# Patient Record
Sex: FEMALE | Race: WHITE | NOT HISPANIC OR LATINO | URBAN - METROPOLITAN AREA
[De-identification: names, ages, dates, MRNs, and addresses within clinical notes are randomized per-mention and may not be internally consistent; named-entity substitution may affect disease eponyms.]

---

## 2019-05-08 ENCOUNTER — EMERGENCY (EMERGENCY)
Facility: HOSPITAL | Age: 84
LOS: 1 days | Discharge: HOME | End: 2019-05-08
Attending: EMERGENCY MEDICINE | Admitting: EMERGENCY MEDICINE
Payer: MEDICARE

## 2019-05-08 VITALS
RESPIRATION RATE: 18 BRPM | TEMPERATURE: 99 F | DIASTOLIC BLOOD PRESSURE: 67 MMHG | OXYGEN SATURATION: 98 % | SYSTOLIC BLOOD PRESSURE: 130 MMHG | HEART RATE: 74 BPM

## 2019-05-08 DIAGNOSIS — M54.9 DORSALGIA, UNSPECIFIED: ICD-10-CM

## 2019-05-08 DIAGNOSIS — I63.9 CEREBRAL INFARCTION, UNSPECIFIED: ICD-10-CM

## 2019-05-08 DIAGNOSIS — Z79.1 LONG TERM (CURRENT) USE OF NON-STEROIDAL ANTI-INFLAMMATORIES (NSAID): ICD-10-CM

## 2019-05-08 DIAGNOSIS — Z79.82 LONG TERM (CURRENT) USE OF ASPIRIN: ICD-10-CM

## 2019-05-08 DIAGNOSIS — E78.5 HYPERLIPIDEMIA, UNSPECIFIED: ICD-10-CM

## 2019-05-08 DIAGNOSIS — Z79.899 OTHER LONG TERM (CURRENT) DRUG THERAPY: ICD-10-CM

## 2019-05-08 LAB
ALBUMIN SERPL ELPH-MCNC: 3.3 G/DL — LOW (ref 3.4–5)
ALP SERPL-CCNC: 108 U/L — SIGNIFICANT CHANGE UP (ref 40–120)
ALT FLD-CCNC: 17 U/L — SIGNIFICANT CHANGE UP (ref 12–42)
ANION GAP SERPL CALC-SCNC: 6 MMOL/L — LOW (ref 9–16)
APPEARANCE UR: CLEAR — SIGNIFICANT CHANGE UP
AST SERPL-CCNC: 22 U/L — SIGNIFICANT CHANGE UP (ref 15–37)
BACTERIA # UR AUTO: PRESENT /HPF
BASOPHILS NFR BLD AUTO: 0.5 % — SIGNIFICANT CHANGE UP (ref 0–2)
BILIRUB SERPL-MCNC: 0.7 MG/DL — SIGNIFICANT CHANGE UP (ref 0.2–1.2)
BILIRUB UR-MCNC: ABNORMAL
BUN SERPL-MCNC: 23 MG/DL — SIGNIFICANT CHANGE UP (ref 7–23)
CALCIUM SERPL-MCNC: 9.3 MG/DL — SIGNIFICANT CHANGE UP (ref 8.5–10.5)
CHLORIDE SERPL-SCNC: 106 MMOL/L — SIGNIFICANT CHANGE UP (ref 96–108)
CO2 SERPL-SCNC: 28 MMOL/L — SIGNIFICANT CHANGE UP (ref 22–31)
COLOR SPEC: YELLOW — SIGNIFICANT CHANGE UP
CREAT SERPL-MCNC: 1.17 MG/DL — SIGNIFICANT CHANGE UP (ref 0.5–1.3)
DIFF PNL FLD: NEGATIVE — SIGNIFICANT CHANGE UP
EOSINOPHIL NFR BLD AUTO: 1.2 % — SIGNIFICANT CHANGE UP (ref 0–6)
EPI CELLS # UR: ABNORMAL /HPF (ref 0–5)
GLUCOSE SERPL-MCNC: 106 MG/DL — HIGH (ref 70–99)
GLUCOSE UR QL: NEGATIVE — SIGNIFICANT CHANGE UP
HCT VFR BLD CALC: 41 % — SIGNIFICANT CHANGE UP (ref 34.5–45)
HGB BLD-MCNC: 14 G/DL — SIGNIFICANT CHANGE UP (ref 11.5–15.5)
HYALINE CASTS # UR AUTO: ABNORMAL /LPF (ref 0–2)
IMM GRANULOCYTES NFR BLD AUTO: 0.8 % — SIGNIFICANT CHANGE UP (ref 0–1.5)
KETONES UR-MCNC: NEGATIVE — SIGNIFICANT CHANGE UP
LEUKOCYTE ESTERASE UR-ACNC: NEGATIVE — SIGNIFICANT CHANGE UP
LYMPHOCYTES # BLD AUTO: 18.9 % — SIGNIFICANT CHANGE UP (ref 13–44)
MAGNESIUM SERPL-MCNC: 1.8 MG/DL — SIGNIFICANT CHANGE UP (ref 1.6–2.6)
MCHC RBC-ENTMCNC: 30.2 PG — SIGNIFICANT CHANGE UP (ref 27–34)
MCHC RBC-ENTMCNC: 34.1 G/DL — SIGNIFICANT CHANGE UP (ref 32–36)
MCV RBC AUTO: 88.6 FL — SIGNIFICANT CHANGE UP (ref 80–100)
MONOCYTES NFR BLD AUTO: 10.6 % — SIGNIFICANT CHANGE UP (ref 2–14)
NEUTROPHILS NFR BLD AUTO: 68 % — SIGNIFICANT CHANGE UP (ref 43–77)
NITRITE UR-MCNC: NEGATIVE — SIGNIFICANT CHANGE UP
PH UR: 5.5 — SIGNIFICANT CHANGE UP (ref 5–8)
PLATELET # BLD AUTO: 183 K/UL — SIGNIFICANT CHANGE UP (ref 150–400)
POTASSIUM SERPL-MCNC: 3.7 MMOL/L — SIGNIFICANT CHANGE UP (ref 3.5–5.3)
POTASSIUM SERPL-SCNC: 3.7 MMOL/L — SIGNIFICANT CHANGE UP (ref 3.5–5.3)
PROT SERPL-MCNC: 6.9 G/DL — SIGNIFICANT CHANGE UP (ref 6.4–8.2)
PROT UR-MCNC: ABNORMAL MG/DL
RBC # BLD: 4.63 M/UL — SIGNIFICANT CHANGE UP (ref 3.8–5.2)
RBC # FLD: 12.2 % — SIGNIFICANT CHANGE UP (ref 10.3–14.5)
RBC CASTS # UR COMP ASSIST: < 5 /HPF — SIGNIFICANT CHANGE UP
SODIUM SERPL-SCNC: 140 MMOL/L — SIGNIFICANT CHANGE UP (ref 132–145)
SP GR SPEC: >=1.03 — SIGNIFICANT CHANGE UP (ref 1–1.03)
UROBILINOGEN FLD QL: 1 E.U./DL — SIGNIFICANT CHANGE UP
WBC # BLD: 8.5 K/UL — SIGNIFICANT CHANGE UP (ref 3.8–10.5)
WBC # FLD AUTO: 8.5 K/UL — SIGNIFICANT CHANGE UP (ref 3.8–10.5)
WBC UR QL: < 5 /HPF — SIGNIFICANT CHANGE UP

## 2019-05-08 PROCEDURE — 70450 CT HEAD/BRAIN W/O DYE: CPT | Mod: 26

## 2019-05-08 PROCEDURE — 71045 X-RAY EXAM CHEST 1 VIEW: CPT | Mod: 26

## 2019-05-08 PROCEDURE — 72170 X-RAY EXAM OF PELVIS: CPT | Mod: 26

## 2019-05-08 PROCEDURE — 72100 X-RAY EXAM L-S SPINE 2/3 VWS: CPT | Mod: 26

## 2019-05-08 PROCEDURE — 99284 EMERGENCY DEPT VISIT MOD MDM: CPT | Mod: 25

## 2019-05-08 RX ORDER — SODIUM CHLORIDE 9 MG/ML
3 INJECTION INTRAMUSCULAR; INTRAVENOUS; SUBCUTANEOUS EVERY 8 HOURS
Qty: 0 | Refills: 0 | Status: DISCONTINUED | OUTPATIENT
Start: 2019-05-08 | End: 2019-05-12

## 2019-05-08 RX ADMIN — SODIUM CHLORIDE 3 MILLILITER(S): 9 INJECTION INTRAMUSCULAR; INTRAVENOUS; SUBCUTANEOUS at 15:07

## 2019-05-08 NOTE — ED PROVIDER NOTE - NSFOLLOWUPINSTRUCTIONS_ED_ALL_ED_FT
Please follow up with the neurologist    Please arranged for a visiting doctor for your mom.    Return for any worsening symptoms: unilateral focal weakness, seizure, severe headache.

## 2019-05-08 NOTE — ED PROVIDER NOTE - PROGRESS NOTE DETAILS
CT brain with unchanged size of hyperdensity in parafalcine region.  Possible acute lacunar infarct.  Discussed with patient and son in detail.  She is adamant about not staying in the hospital and had been part of the conversation prior to obtaining the imaging.  Currently on ASA, statin.  Will give follow up to see Dr. Anderson.  Reviewed case with Dr. Anderson, since symptoms have been gradual in onset and no focal weakness would not recommend adding pavix or other agents.

## 2019-05-08 NOTE — ED PROVIDER NOTE - CLINICAL SUMMARY MEDICAL DECISION MAKING FREE TEXT BOX
89 y/o F with PMHx of hyperlipidemia and arthritis presents to ED for loss of appetite. Pt was here multiple times in 2015 and 2016 for shoulder and back pain. We have reviewed her XR from 2016. There is evidence of L4-L5 anterolisthesis, as well as compression deformity of L1. 89 y/o F with PMHx of hyperlipidemia and arthritis presents to ED for loss of appetite, ? sundowning behavior.  BIB son and HHA.  Ambulates with rolling walker.  Has 24 hour care at home.  Does not leave her home or see a doctor regularly.  Medication list reviewed.   Patient is lucid, AAOx 3. Non focal neurological exam.  No evidence of recent trauma or injury.  Lungs clear, no abdominal tenderness.  Imaging, labs reviewed.  Prior to discharged reviewed previous CT scan which was concerning for meningioma.  As per son patient did not follow up to have MRI performed.  She denies headaches, ataxia.  Amenable to repeat imaging today.  Patient seen by SW and APS report entered.  Also given resources for home care and added A coverage.

## 2019-05-08 NOTE — ED PROVIDER NOTE - OBJECTIVE STATEMENT
89 y/o F with PMHx of hyperlipidemia and arthritis is accompanied to ED with a family member for loss of appetite. Pt's son provides Hx, and states she has not been eating or drinking water for the past 2 weeks, and arrived to ED out of concern for possible dehydration. Her son also states Pt has not been sleeping well for the past 2 weeks as well. Pt reports of having back pain for about a week, and pain on her R side for the past few days. Her son reports she fell on her back while in her apartment. Pt denies any BRAND, dizziness, or light headedness. She attests to not having a PCP.     Pt takes ABX, Citalopram, Donepezil, Simvastatin, Vit. D3, Famotidine, stool softeners, ASA, and Vit B12. 89 y/o F with PMHx of hyperlipidemia and arthritis is accompanied to ED with a family member for loss of appetite. Pt's son provides Hx, and states she has not been eating or drinking water for the past 2 weeks, and arrived to ED out of concern for possible dehydration. Her son also states Pt has not been sleeping well for the past 2 weeks as well. Pt reports of having back pain for about a week, and pain on her R side for the past few days. Her son reports she fell on her back while in her apartment. Pt denies any BRAND, dizziness, or light headedness. She attests to not having a PCP.  Accompanied by her HHA and has 7 days/week nearly 24 hours of coverage.      Pt takes ABX, Citalopram, Donepezil, Simvastatin, Vit. D3, Famotidine, stool softeners, ASA, and Vit B12.

## 2019-05-08 NOTE — ED PROVIDER NOTE - PRIOR HOSPITAL/ED VISITS SUMMARY FREE TEXT FOR MDM OBTAINED AND REVIEWED OLD RECORDS QUESTION
Pt was here multiple times in 2015 and 2016 for shoulder and back pain. We have reviewed her XR from 2016. There is evidence of L4-L5 anterolisthesis, as well as compression deformity of L1.

## 2019-05-08 NOTE — ED PROVIDER NOTE - DIAGNOSTIC INTERPRETATION
ER Physician: Ray Figueroa  CXR INTERPRETATION:  no acute fracture; no soft tissue swelling noted; normal bony alignment.   ER Physician: Ray Figueroa  Pelvis INTERPRETATION:  no acute fracture; no soft tissue swelling noted; normal bony alignment.  ER Physician: Ray Figueroa  Spine INTERPRETATION:  no acute fracture; no soft tissue swelling noted; normal bony alignment.

## 2019-05-08 NOTE — ED ADULT TRIAGE NOTE - CHIEF COMPLAINT QUOTE
as per son failure to thrive- loss of appetite for 2 days. denies pain. recent UTI on levaquin. also complains lower back pain s/p fall 1 week ago/as per aide

## 2019-05-08 NOTE — ED PROVIDER NOTE - CARE PROVIDER_API CALL
Rahat Anderson (DO)  Critical Care Medicine; Neurology  [address], [address] [facility] [place] [address]  Phone: [phone]  Fax: [phone]  Follow Up Time:

## 2019-05-08 NOTE — ED ADULT NURSE NOTE - NSFALLRSKHRMRISKTYPE_ED_ALL_ED
age(85 years old or older)/coagulation(Bleeding disorder R/T clinical cond/anti-coags)/bones(Osteoporosis,prev fx,steroid use,metastatic bone ca)

## 2019-05-08 NOTE — ED PROVIDER NOTE - MUSCULOSKELETAL, MLM
Spine appears normal, range of motion is not limited, no muscle or joint tenderness.  No leg shortening.  Negative modified straight leg raise.  No pain with log rolling or internal/external rotation of the hips.  NO midline spine tenderness.

## 2019-05-08 NOTE — ED PROVIDER NOTE - PMH
Arthritis  in LE b/l, ambulatory with cane at baseline  Constipation  Constipation  Dementia without behavioral disturbance  Dementia  Gastroesophageal reflux disease  GERD (gastroesophageal reflux disease)  Hyperlipidemia  Hyperlipidemia  Hypertonicity of bladder  Overactive bladder

## 2019-05-09 NOTE — ED POST DISCHARGE NOTE - DETAILS
left message for son on 5/9/19 Spoke with son- Sw will try to set them up with care. She has no PMD and is home bound.

## 2019-05-09 NOTE — ED BEHAVIORAL HEALTH NOTE - BEHAVIORAL HEALTH NOTE
Post Discharge note: This writer meet with the patient and her son Tonio yesterday prior to discharge. The patient is a 89 year old female who lives at home with her 24/7 that she and her family pay privately for. Per the patients son the patient ambulates with a walker and has not left her home in over a year and half and currently has no pcp. This writer spoke with the son and he is currently in the process of reactivating her medicaid. Her was provided with the suggestion of following up with an elder care attonery but he declined and was provided with information for the [facility] medicaid office, the HRA offices and [facility] social . The patient and her son were also offered for this writer to set her up with a new pcp in the community but they also declined due to her mobility issue. They were also provided with information for the house calls programs, in which doctors could see her at home, which they were interested in.     This writer also put in an APS  (adult Protective Service) report through their online system due to her safety concern, the fact that she has not seen a doctor in over 1.5 years and the son being slightly overwhelmed. The reference number is [id number].    Transportation was also arranged with a bill back to the facility as the patients insurance denied coverage for the transportation. Senior staff was made aware of the bill back.    This afternoon patient was alerted to the fact that the patient has a public fx and would need home care services. However, since the patient does not have a pcp in the community, home care services could not be in place as there is no pcp to sign off on the orders and take care of the patient in the community. This was explained to the patients son Tonio who expressed understanding and stated that he would call the house call programs in order to get her seen. He was also offered for the writer to arrange a pcp apt in the community but he declined again. Worker made available for any further assistance needed

## 2019-05-22 PROBLEM — Z00.00 ENCOUNTER FOR PREVENTIVE HEALTH EXAMINATION: Status: ACTIVE | Noted: 2019-05-22

## 2019-05-23 ENCOUNTER — APPOINTMENT (OUTPATIENT)
Dept: INTERNAL MEDICINE | Facility: HOME HEALTH | Age: 84
End: 2019-05-23
Payer: MEDICARE

## 2019-05-23 VITALS
HEART RATE: 69 BPM | HEIGHT: 57 IN | TEMPERATURE: 98.4 F | RESPIRATION RATE: 18 BRPM | DIASTOLIC BLOOD PRESSURE: 60 MMHG | WEIGHT: 120 LBS | SYSTOLIC BLOOD PRESSURE: 118 MMHG | OXYGEN SATURATION: 95 % | BODY MASS INDEX: 25.89 KG/M2

## 2019-05-23 DIAGNOSIS — Z87.891 PERSONAL HISTORY OF NICOTINE DEPENDENCE: ICD-10-CM

## 2019-05-23 DIAGNOSIS — Z82.3 FAMILY HISTORY OF STROKE: ICD-10-CM

## 2019-05-23 DIAGNOSIS — Z86.73 PERSONAL HISTORY OF TRANSIENT ISCHEMIC ATTACK (TIA), AND CEREBRAL INFARCTION W/OUT RESIDUAL DEFICITS: ICD-10-CM

## 2019-05-23 DIAGNOSIS — Z78.9 OTHER SPECIFIED HEALTH STATUS: ICD-10-CM

## 2019-05-23 DIAGNOSIS — Z98.890 OTHER SPECIFIED POSTPROCEDURAL STATES: ICD-10-CM

## 2019-05-23 DIAGNOSIS — M54.5 LOW BACK PAIN: ICD-10-CM

## 2019-05-23 PROCEDURE — 99350 HOME/RES VST EST HIGH MDM 60: CPT

## 2019-05-23 PROCEDURE — G0506: CPT

## 2019-05-23 PROCEDURE — 99497 ADVNCD CARE PLAN 30 MIN: CPT

## 2019-05-23 RX ORDER — DOCUSATE SODIUM 100 MG/1
100 CAPSULE, LIQUID FILLED ORAL AT BEDTIME
Qty: 90 | Refills: 3 | Status: ACTIVE | COMMUNITY
Start: 2019-05-23

## 2019-05-23 RX ORDER — CHLORHEXIDINE GLUCONATE 4 %
1000 LIQUID (ML) TOPICAL DAILY
Qty: 90 | Refills: 3 | Status: ACTIVE | COMMUNITY
Start: 2019-05-23

## 2019-05-23 NOTE — PHYSICAL EXAM
[No Acute Distress] : no acute distress [Well Nourished] : well nourished [Well Developed] : well developed [Normal Sclera/Conjunctiva] : normal sclera/conjunctiva [EOMI] : extra ocular movement intact [Normal Outer Ear/Nose] : the ears and nose were normal in appearance [Normal Oropharynx] : the oropharynx was normal [No Respiratory Distress] : no respiratory distress [Clear to Auscultation] : lungs were clear to auscultation bilaterally [No Accessory Muscle Use] : no accessory muscle use [Normal Rate] : heart rate was normal  [Regular Rhythm] : with a regular rhythm [Normal S1, S2] : normal S1 and S2 [No Murmurs] : no murmurs heard [No Edema] : there was no peripheral edema [Normal Bowel Sounds] : normal bowel sounds [Soft] : abdomen soft [Not Distended] : not distended [Normal Post Cervical Nodes] : no posterior cervical lymphadenopathy [Normal Anterior Cervical Nodes] : no anterior cervical lymphadenopathy [No CVA Tenderness] : no ~M costovertebral angle tenderness [No Spinal Tenderness] : no spinal tenderness [Normal Strength/Tone] : muscle strength and tone were normal [No Rash] : no rash [Oriented x3] : oriented to person, place, and time [Normal Affect] : the affect was normal [No Joint Swelling] : no joint swelling seen [Cranial Nerves Intact] : cranial nerves 2-12 were intact [de-identified] : Frail elderly women sitting in the chair, pleasant with no cardiovascular or respiratory distress noted [de-identified] : Complaining of abdominal but no pain noted on palpation.  [de-identified] : dry skin

## 2019-05-23 NOTE — HISTORY OF PRESENT ILLNESS
[Patient] : patient [Family Member] : family member [FreeTextEntry1] : Dementia with behavioral disturbance, impaired gait unable to leave home.  right superior pubic fracture.   [FreeTextEntry2] : ELIAZAR HE is an 89 year with arthritis of bilateral lower extremities, dementia with behavioral disturbance, depression gastroesophageal reflux disease,CAD with PCI, hyperlipidemia, hypertonicity of bladder overactive bladder seen today for initial home visit.\par \par Accompanying patient today is her son Tonio\par Patient's last hospitalization was on 5/8/19 for an ER visit after sustaining a fall.  Her son Tonio also had concerns that she was not eating or drinking well.  Hospital labs were unremarkable other then a low albumin level.  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 These results were compatible with previous films in 2012.  Pelvis x-rays  also showed a subacute fracture of the right superior and inferior rami of the right pubic bone.\par Since hospitalization patient/ patient's caregiver reports that the patient is constipated currently takes Colace sometimes.  Reports occasional abdominal pain which she feels is related to the constipation\par Patient/ patient's caregiver reports no weight loss >10lbs in the past 6 months. No changes in dentition or swallowing reported, No changes in hearing or vision reported. No changes in Cognition reported. Patient denies any symptoms of depression or anxiety. Patient is ADL and IADL dependent. No changes in gait or falls reported. \par Patient's home environment is safe. \par Goals of care discussed. MOLST completed. \par \par

## 2019-05-23 NOTE — COUNSELING
[Normal Weight (BMI <25)] : normal weight - BMI <25 [High cholesterol self management education material provided] : High cholesterol self management education material provided [Non - Smoker] : non-smoker [Use assistive device to avoid falls] : use assistive device to avoid falls [Improve mobility] : improve mobility [Decrease hospital use] : decrease hospital use [Maintain functional ability] : maintain functional ability [Discussed disease trajectory with patient/caregiver] : discussed disease trajectory with patient/caregiver [DNR] : Code Status: DNR [Limited] : Treatment Guidelines: Limited [DNI] : Intubation: DNI [[facility]: _____[facility]: [unfilled] [_____] : HCP: [unfilled] [ - New patient with 2 or more chronic conditions; CCM discussed and patient-centered care plan established] : New patient with 2 or more chronic conditions; CCM discussed and patient-centered care plan established [Patient/Caregiver not ready to engage in discussion] : patient/caregiver not ready to engage in discussion [Advanced Directives discussed: ____] : Advanced directives discussed: [unfilled] [FreeTextEntry4] : Educated patient/legal representative about CCM services and consent.\par Educated patient/legal representative that this service is available because they have 2 or more chronic conditions, that only one Provider can furnish CCM Services per calendar month and that CCM services are subject to the usual Medicare deductible and coinsurance. \par Patient/legal representative understands the right to stop the CCM services at any time by notifying House Calls office.\par Patient/legal representative has verbally consented (y/n):YES\par \par

## 2019-05-23 NOTE — CHRONIC CARE ASSESSMENT
[Recent decline in ADLs or iADLs] : Recent decline in ADLs or IADLs [Reviewed Fall Risk from Comprehensive Assessment] : Reviewed fall risk from comprehensive assessment [No Action Needed] : No action needed [PPS Score: ____] : Palliative Performance Scale (PPS) Score: [unfilled] [Current] : Current Pain: [Oriented To Person] : ~L oriented to person [Oriented To Place] : ~L oriented to place [Oriented To Time] : ~L oriented to time [Alert] : ~L alert [Reviewed Mini-Cog Outcomes from Comprehensive Assessment] : Reviewed Mini-Cog outcomes from comprehensive assessment [FAST Score: ____] : Functional Assessment Scale (FAST) Score: [unfilled] [Safety elements used in home] : No safety elements used in home [Reports changes in hearing] : reports no changes in hearing [Reports changes in vision] : Reports no changes in vision [de-identified] : Abdominal related to constipation.  [de-identified] : Currently on Celexa [de-identified] : Receives 24 home health care services

## 2019-05-23 NOTE — REASON FOR VISIT
[Initial Evaluation] : an initial evaluation [Pre-Visit Preparation] : pre-visit preparation was done [Family Member] : family member [Formal Caregiver] : formal caregiver [FreeTextEntry1] : Dementia with behavioral disturbance, impaired gait unable to leave home.  right superior pubic fracture.

## 2019-05-23 NOTE — REVIEW OF SYSTEMS
[Negative] : Heme/Lymph [Abdominal Pain] : abdominal pain [Constipation] : constipation [Joint Pain] : joint pain [Joint Stiffness] : joint stiffness [Joint Swelling] : joint swelling [Unsteady Walking] : ataxia [Depression] : depression [FreeTextEntry2] : Frail elderly female [de-identified] : On Celexa

## 2019-05-31 ENCOUNTER — MOBILE ON CALL (OUTPATIENT)
Age: 84
End: 2019-05-31

## 2019-05-31 RX ORDER — MELATONIN 3 MG
25 MCG TABLET ORAL DAILY
Qty: 90 | Refills: 3 | Status: ACTIVE | COMMUNITY
Start: 2019-05-31 | End: 1900-01-01

## 2019-06-25 ENCOUNTER — APPOINTMENT (OUTPATIENT)
Dept: INTERNAL MEDICINE | Facility: HOME HEALTH | Age: 84
End: 2019-06-25
Payer: MEDICARE

## 2019-06-25 VITALS
SYSTOLIC BLOOD PRESSURE: 120 MMHG | HEART RATE: 70 BPM | TEMPERATURE: 97 F | RESPIRATION RATE: 18 BRPM | BODY MASS INDEX: 25.89 KG/M2 | DIASTOLIC BLOOD PRESSURE: 80 MMHG | WEIGHT: 120 LBS | OXYGEN SATURATION: 98 % | HEIGHT: 57 IN

## 2019-06-25 DIAGNOSIS — Z91.89 OTHER SPECIFIED PERSONAL RISK FACTORS, NOT ELSEWHERE CLASSIFIED: ICD-10-CM

## 2019-06-25 PROCEDURE — 99349 HOME/RES VST EST MOD MDM 40: CPT

## 2019-06-25 NOTE — COUNSELING
[Normal Weight (BMI <25)] : normal weight - BMI <25 [Non - Smoker] : non-smoker [High cholesterol self management education material provided] : High cholesterol self management education material provided [Improve mobility] : improve mobility [Use assistive device to avoid falls] : use assistive device to avoid falls [Maintain functional ability] : maintain functional ability [Decrease hospital use] : decrease hospital use [Patient/Caregiver not ready to engage in discussion] : patient/caregiver not ready to engage in discussion [Discussed disease trajectory with patient/caregiver] : discussed disease trajectory with patient/caregiver [DNR] : Code Status: DNR [Advanced Directives discussed: ____] : Advanced directives discussed: [unfilled] [Limited] : Treatment Guidelines: Limited [DNI] : Intubation: DNI [[facility]: _____[facility]/last verification date: [unfilled] [_____] : HCP: [unfilled] [FreeTextEntry4] : Educated patient/legal representative about CCM services and consent.\par Educated patient/legal representative that this service is available because they have 2 or more chronic conditions, that only one Provider can furnish CCM Services per calendar month and that CCM services are subject to the usual Medicare deductible and coinsurance. \par Patient/legal representative understands the right to stop the CCM services at any time by notifying House Calls office.\par Patient/legal representative has verbally consented (y/n):YES\par \par

## 2019-06-25 NOTE — HISTORY OF PRESENT ILLNESS
[Patient] : patient [Family Member] : family member [FreeTextEntry2] : ELIAZAR HE is an 89 year with arthritis of bilateral lower extremities, dementia with behavioral disturbance, depression gastroesophageal reflux disease,CAD with PCI, hyperlipidemia, hypertonicity of bladder overactive bladder seen today for follow up visit\par \par On last visit patient's caregiver reported increased constipated currently takes Colace sometimes.  Reports occasional abdominal pain which she feels is related to the constipation\par \par Currently no concerns reported.  Appetite is good.  Sometimes non-compliant with medications.  Patient would like to ambulate more and build up her endurance.  \par \par Patient/ patient's caregiver reports no weight loss >10lbs in the past 6 months. No changes in dentition or swallowing reported, No changes in hearing or vision reported. No changes in Cognition reported. Patient denies any symptoms of depression or anxiety. Patient is ADL and IADL dependent. No changes in gait or falls reported. \par Patient's home environment is safe. \par \par \par  [FreeTextEntry1] : Dementia with behavioral disturbance, impaired gait unable to leave home.  right superior pubic fracture.

## 2019-06-25 NOTE — PHYSICAL EXAM
[No Acute Distress] : no acute distress [Well Nourished] : well nourished [Normal Sclera/Conjunctiva] : normal sclera/conjunctiva [Well Developed] : well developed [EOMI] : extra ocular movement intact [Normal Outer Ear/Nose] : the ears and nose were normal in appearance [No Respiratory Distress] : no respiratory distress [Normal Oropharynx] : the oropharynx was normal [Clear to Auscultation] : lungs were clear to auscultation bilaterally [No Accessory Muscle Use] : no accessory muscle use [Normal Rate] : heart rate was normal  [Regular Rhythm] : with a regular rhythm [Normal S1, S2] : normal S1 and S2 [No Murmurs] : no murmurs heard [No Edema] : there was no peripheral edema [Normal Bowel Sounds] : normal bowel sounds [Soft] : abdomen soft [Normal Post Cervical Nodes] : no posterior cervical lymphadenopathy [Normal Anterior Cervical Nodes] : no anterior cervical lymphadenopathy [Not Distended] : not distended [No CVA Tenderness] : no ~M costovertebral angle tenderness [No Spinal Tenderness] : no spinal tenderness [No Rash] : no rash [Oriented x3] : oriented to person, place, and time [Normal Affect] : the affect was normal [Cranial Nerves Intact] : cranial nerves 2-12 were intact [No JVD] : no jugular venous distention [Kyphosis] :  kyphosis present [de-identified] : Complaining of abdominal but no pain noted on palpation.  [de-identified] : Frail elderly women sitting in the chair, pleasant with no cardiovascular or respiratory distress noted during examination [de-identified] : dry skin

## 2019-06-25 NOTE — CHRONIC CARE ASSESSMENT
[FAST Score: ____] : Functional Assessment Scale (FAST) Score: [unfilled] [PPS Score: ____] : Palliative Performance Scale (PPS) Score: [unfilled] [Current] : Current Pain: [Oriented To Place] : ~L oriented to place [Oriented To Time] : ~L oriented to time [Oriented To Person] : ~L oriented to person [Alert] : ~L alert [Reviewed Mini-Cog Outcomes from Comprehensive Assessment] : Reviewed Mini-Cog outcomes from comprehensive assessment [Recent decline in ADLs or iADLs] : Recent decline in ADLs or IADLs [No Action Needed] : No action needed [Reviewed Fall Risk from Comprehensive Assessment] : Reviewed fall risk from comprehensive assessment [Reports changes in hearing] : reports no changes in hearing [Safety elements used in home] : No safety elements used in home [Reports changes in vision] : Reports no changes in vision [de-identified] : Abdominal related to constipation.  [de-identified] : Currently on Celexa [de-identified] : Receives 24 home health care services

## 2019-06-25 NOTE — REASON FOR VISIT
[Formal Caregiver] : formal caregiver [Pre-Visit Preparation] : pre-visit preparation was done [Follow-Up] : a follow-up visit [FreeTextEntry1] : Dementia with behavioral disturbance, impaired gait unable to leave home.  right superior pubic fracture.   [FreeTextEntry2] : medication, labs, orders.

## 2019-06-25 NOTE — REVIEW OF SYSTEMS
[Joint Pain] : joint pain [Joint Swelling] : joint swelling [Joint Stiffness] : joint stiffness [Unsteady Walking] : ataxia [Depression] : depression [Negative] : Neurological [Chest Pain] : no chest pain [Shortness Of Breath] : no shortness of breath [Cough] : no cough [Palpitations] : no palpitations [FreeTextEntry2] : Frail elderly female seen sitting in chair without cardiovascular or respiratory distress noted [Constipation] : no constipation [Abdominal Pain] : no abdominal pain [de-identified] : On Celexa

## 2019-07-15 ENCOUNTER — APPOINTMENT (OUTPATIENT)
Dept: INTERNAL MEDICINE | Facility: HOME HEALTH | Age: 84
End: 2019-07-15

## 2019-07-30 ENCOUNTER — APPOINTMENT (OUTPATIENT)
Dept: INTERNAL MEDICINE | Facility: HOME HEALTH | Age: 84
End: 2019-07-30
Payer: MEDICARE

## 2019-07-30 VITALS
TEMPERATURE: 97.4 F | DIASTOLIC BLOOD PRESSURE: 80 MMHG | HEART RATE: 68 BPM | OXYGEN SATURATION: 98 % | RESPIRATION RATE: 18 BRPM | SYSTOLIC BLOOD PRESSURE: 112 MMHG

## 2019-07-30 DIAGNOSIS — M17.0 BILATERAL PRIMARY OSTEOARTHRITIS OF KNEE: ICD-10-CM

## 2019-07-30 PROCEDURE — 99348 HOME/RES VST EST LOW MDM 30: CPT

## 2019-07-30 NOTE — COUNSELING
[Improve mobility] : improve mobility [Discussed disease trajectory with patient/caregiver] : discussed disease trajectory with patient/caregiver [Maintain functional ability] : maintain functional ability [Decrease hospital use] : decrease hospital use [Patient/Caregiver not ready to engage in discussion] : patient/caregiver not ready to engage in discussion [DNR] : Code Status: DNR [Advanced Directives discussed: ____] : Advanced directives discussed: [unfilled] [[facility] Date: _____] [facility] Completion/last verification date: [unfilled] [DNI] : Intubation: DNI [Limited] : Treatment Guidelines: Limited [_____] : HCP: [unfilled] [Normal Weight (BMI <25)] : normal weight - BMI <25 [Use assistive device to avoid falls] : use assistive device to avoid falls [Normal Weight - ( BMI  <25 )] : normal weight - ( BMI  <25 ) [High cholesterol self management education material provided] : high cholesterol self management education material provided [Hypertension self management education material provided] : hypertension self management education material provided [Non - Smoker] : non-smoker [Medical alert] : medical alert [] : foot exam [FreeTextEntry4] : Educated patient/legal representative about CCM services and consent.\par Educated patient/legal representative that this service is available because they have 2 or more chronic conditions, that only one Provider can furnish CCM Services per calendar month and that CCM services are subject to the usual Medicare deductible and coinsurance. \par Patient/legal representative understands the right to stop the CCM services at any time by notifying House Calls office.\par Patient/legal representative has verbally consented (y/n):YES\par \par

## 2019-07-30 NOTE — PHYSICAL EXAM
[No Acute Distress] : no acute distress [Well Nourished] : well nourished [Well Developed] : well developed [Normal Sclera/Conjunctiva] : normal sclera/conjunctiva [EOMI] : extra ocular movement intact [Normal Oropharynx] : the oropharynx was normal [Normal Outer Ear/Nose] : the ears and nose were normal in appearance [No Respiratory Distress] : no respiratory distress [No JVD] : no jugular venous distention [No Accessory Muscle Use] : no accessory muscle use [Clear to Auscultation] : lungs were clear to auscultation bilaterally [Normal Rate] : heart rate was normal  [Normal S1, S2] : normal S1 and S2 [Regular Rhythm] : with a regular rhythm [No Murmurs] : no murmurs heard [No Edema] : there was no peripheral edema [Normal Bowel Sounds] : normal bowel sounds [Not Distended] : not distended [Normal Post Cervical Nodes] : no posterior cervical lymphadenopathy [Soft] : abdomen soft [Normal Anterior Cervical Nodes] : no anterior cervical lymphadenopathy [No CVA Tenderness] : no ~M costovertebral angle tenderness [Kyphosis] :  kyphosis present [No Spinal Tenderness] : no spinal tenderness [Cranial Nerves Intact] : cranial nerves 2-12 were intact [Oriented x3] : oriented to person, place, and time [No Rash] : no rash [Normal Affect] : the affect was normal [Non Tender] : non-tender [de-identified] : dry skin [de-identified] : Frail elderly women sitting in the chair, pleasant with no cardiovascular or respiratory distress noted during examination

## 2019-07-30 NOTE — HEALTH RISK ASSESSMENT
[HRA Reviewed] : Health risk assessment reviewed [Full assistance needed] : managing finances [No falls in past year] : Patient reported no falls in the past year [Yes] : The patient does have visual impairment [TimeGetUpGo] : 0

## 2019-07-30 NOTE — REASON FOR VISIT
[Formal Caregiver] : formal caregiver [Follow-Up] : a follow-up visit [Pre-Visit Preparation] : pre-visit preparation was done [FreeTextEntry2] : medication, labs, orders.  [FreeTextEntry1] : Dementia with behavioral disturbance, impaired gait unable to leave home.  right superior pubic fracture.

## 2019-07-30 NOTE — REVIEW OF SYSTEMS
[Joint Stiffness] : joint stiffness [Joint Pain] : joint pain [Unsteady Walking] : ataxia [Joint Swelling] : joint swelling [Depression] : depression [Negative] : Heme/Lymph [Palpitations] : no palpitations [Shortness Of Breath] : no shortness of breath [Chest Pain] : no chest pain [Constipation] : no constipation [Abdominal Pain] : no abdominal pain [Cough] : no cough [de-identified] : On Celexa [FreeTextEntry2] : Frail elderly female seen sitting in chair without cardiovascular or respiratory distress noted

## 2019-07-30 NOTE — CHRONIC CARE ASSESSMENT
[PPS Score: ____] : Palliative Performance Scale (PPS) Score: [unfilled] [FAST Score: ____] : Functional Assessment Scale (FAST) Score: [unfilled] [Current] : Current Pain: [Oriented To Person] : ~L oriented to person [Alert] : ~L alert [Oriented To Time] : ~L oriented to time [Oriented To Place] : ~L oriented to place [Recent decline in ADLs or iADLs] : Recent decline in ADLs or IADLs [Reviewed Mini-Cog Outcomes from Comprehensive Assessment] : Reviewed Mini-Cog outcomes from comprehensive assessment [No Action Needed] : No action needed [Reviewed Fall Risk from Comprehensive Assessment] : Reviewed fall risk from comprehensive assessment [Safety elements used in home] : No safety elements used in home [Reports changes in hearing] : reports no changes in hearing [Reports changes in vision] : Reports no changes in vision [de-identified] : Abdominal related to constipation.  [de-identified] : Currently on Celexa [de-identified] : Receives [facility] home health care services

## 2019-07-30 NOTE — HISTORY OF PRESENT ILLNESS
[Patient] : patient [Family Member] : family member [FreeTextEntry1] : Dementia with behavioral disturbance, impaired gait unable to leave home.  right superior pubic fracture.   [FreeTextEntry2] : ELIAZAR HE is an 89 year with arthritis of bilateral lower extremities, dementia with behavioral disturbance, depression gastroesophageal reflux disease,CAD with PCI, hyperlipidemia, hypertonicity of bladder overactive bladder seen today for follow up visit\par \par On last visit no concerns reported.  Appetite is good.  Sometimes non-compliant with medications.  Patient would like to ambulate more and build up her endurance.  \par \par Currently no concerns or questions.\par \par Patient/ patient's caregiver reports no weight loss >10lbs in the past 6 months. No changes in dentition or swallowing reported, No changes in hearing or vision reported. No changes in Cognition reported. Patient denies any symptoms of depression or anxiety. Patient is ADL and IADL dependent. No changes in gait or falls reported. \par Patient's home environment is safe. \par \par \par

## 2019-08-15 ENCOUNTER — MEDICATION RENEWAL (OUTPATIENT)
Age: 84
End: 2019-08-15

## 2019-08-15 RX ORDER — CITALOPRAM HYDROBROMIDE 10 MG/1
10 TABLET, FILM COATED ORAL DAILY
Qty: 90 | Refills: 3 | Status: ACTIVE | COMMUNITY
Start: 2019-05-23 | End: 1900-01-01

## 2019-08-15 RX ORDER — SIMVASTATIN 20 MG/1
20 TABLET, FILM COATED ORAL DAILY
Qty: 90 | Refills: 3 | Status: ACTIVE | COMMUNITY
Start: 2019-05-23 | End: 1900-01-01

## 2019-08-15 RX ORDER — DONEPEZIL HYDROCHLORIDE 5 MG/1
5 TABLET ORAL
Qty: 90 | Refills: 3 | Status: ACTIVE | COMMUNITY
Start: 2019-05-23 | End: 1900-01-01

## 2019-09-03 ENCOUNTER — APPOINTMENT (OUTPATIENT)
Dept: INTERNAL MEDICINE | Facility: HOME HEALTH | Age: 84
End: 2019-09-03
Payer: MEDICARE

## 2019-09-03 VITALS
TEMPERATURE: 98 F | DIASTOLIC BLOOD PRESSURE: 80 MMHG | SYSTOLIC BLOOD PRESSURE: 120 MMHG | HEART RATE: 78 BPM | HEIGHT: 57 IN | BODY MASS INDEX: 25.89 KG/M2 | RESPIRATION RATE: 18 BRPM | WEIGHT: 120 LBS | OXYGEN SATURATION: 96 %

## 2019-09-03 PROCEDURE — 99349 HOME/RES VST EST MOD MDM 40: CPT

## 2019-09-03 RX ORDER — ZOLPIDEM TARTRATE 5 MG/1
5 TABLET ORAL
Qty: 30 | Refills: 0 | Status: ACTIVE | COMMUNITY
Start: 2019-09-03 | End: 1900-01-01

## 2019-09-03 NOTE — HEALTH RISK ASSESSMENT
[Full assistance needed] : managing finances [No falls in past year] : Patient reported no falls in the past year [Yes] : The patient does have visual impairment [HRA Reviewed] : Health risk assessments reviewed [TimeGetUpGo] : 0

## 2019-09-03 NOTE — REASON FOR VISIT
[Follow-Up] : a follow-up visit [Pre-Visit Preparation] : pre-visit preparation was done [Formal Caregiver] : formal caregiver [FreeTextEntry1] : Dementia with behavioral disturbance, impaired gait unable to leave home.  right superior pubic fracture.   [FreeTextEntry2] : medication, labs, orders.

## 2019-09-03 NOTE — COUNSELING
[Normal Weight - ( BMI  <25 )] : normal weight - ( BMI  <25 ) [High cholesterol self management education material provided] : high cholesterol self management education material provided [Hypertension self management education material provided] : hypertension self management education material provided [Non - Smoker] : non-smoker [Medical alert] : medical alert [] : foot exam [Improve mobility] : improve mobility [Decrease hospital use] : decrease hospital use [Maintain functional ability] : maintain functional ability [Patient/Caregiver not ready to engage in discussion] : patient/caregiver not ready to engage in discussion [Discussed disease trajectory with patient/caregiver] : discussed disease trajectory with patient/caregiver [Advanced Directives discussed: ____] : Advanced directives discussed: [unfilled] [DNR] : Code Status: DNR [DNI] : Intubation: DNI [Limited] : Treatment Guidelines: Limited [_____] : HCP: [unfilled] [Last Verification Date: _____] : [facility] Completion/last verification date: [unfilled] [FreeTextEntry4] : Educated patient/legal representative about CCM services and consent.\par Educated patient/legal representative that this service is available because they have 2 or more chronic conditions, that only one Provider can furnish CCM Services per calendar month and that CCM services are subject to the usual Medicare deductible and coinsurance. \par Patient/legal representative understands the right to stop the CCM services at any time by notifying [facility] office.\par Patient/legal representative has verbally consented (y/n):YES\par \par

## 2019-09-03 NOTE — PHYSICAL EXAM
[No Acute Distress] : no acute distress [Well Nourished] : well nourished [Well Developed] : well developed [Normal Sclera/Conjunctiva] : normal sclera/conjunctiva [Normal Outer Ear/Nose] : the ears and nose were normal in appearance [EOMI] : extra ocular movement intact [Normal Oropharynx] : the oropharynx was normal [No JVD] : no jugular venous distention [No Respiratory Distress] : no respiratory distress [Clear to Auscultation] : lungs were clear to auscultation bilaterally [No Accessory Muscle Use] : no accessory muscle use [Normal Rate] : heart rate was normal  [Regular Rhythm] : with a regular rhythm [Normal S1, S2] : normal S1 and S2 [No Murmurs] : no murmurs heard [No Edema] : there was no peripheral edema [Normal Bowel Sounds] : normal bowel sounds [Non Tender] : non-tender [Soft] : abdomen soft [Normal Post Cervical Nodes] : no posterior cervical lymphadenopathy [Not Distended] : not distended [Normal Anterior Cervical Nodes] : no anterior cervical lymphadenopathy [No CVA Tenderness] : no ~M costovertebral angle tenderness [No Spinal Tenderness] : no spinal tenderness [Kyphosis] :  kyphosis present [No Rash] : no rash [Cranial Nerves Intact] : cranial nerves 2-12 were intact [Oriented x3] : oriented to person, place, and time [Normal Affect] : the affect was normal [de-identified] : Frail elderly women sitting in the chair, pleasant with no cardiovascular or respiratory distress noted during examination [de-identified] : dry skin

## 2019-09-03 NOTE — REVIEW OF SYSTEMS
[Joint Stiffness] : joint stiffness [Joint Pain] : joint pain [Unsteady Walking] : ataxia [Joint Swelling] : joint swelling [Depression] : depression [Negative] : Heme/Lymph [Chest Pain] : no chest pain [Palpitations] : no palpitations [Shortness Of Breath] : no shortness of breath [Cough] : no cough [Abdominal Pain] : no abdominal pain [Constipation] : no constipation [FreeTextEntry2] : Frail elderly female seen sitting in chair without cardiovascular or respiratory distress noted [de-identified] : On Celexa

## 2019-09-18 ENCOUNTER — RX RENEWAL (OUTPATIENT)
Age: 84
End: 2019-09-18

## 2019-09-20 ENCOUNTER — RX RENEWAL (OUTPATIENT)
Age: 84
End: 2019-09-20

## 2019-09-26 ENCOUNTER — RX RENEWAL (OUTPATIENT)
Age: 84
End: 2019-09-26

## 2019-09-26 RX ORDER — FAMOTIDINE 20 MG/1
20 TABLET, FILM COATED ORAL TWICE DAILY
Qty: 180 | Refills: 3 | Status: ACTIVE | COMMUNITY
Start: 2019-05-23 | End: 1900-01-01

## 2019-10-16 PROBLEM — I25.10 CAD (CORONARY ARTERY DISEASE): Status: ACTIVE | Noted: 2019-05-23

## 2019-10-16 PROBLEM — K59.00 CONSTIPATION, UNSPECIFIED CONSTIPATION TYPE: Status: ACTIVE | Noted: 2019-05-23

## 2019-10-16 PROBLEM — K21.9 GASTROESOPHAGEAL REFLUX DISEASE, ESOPHAGITIS PRESENCE NOT SPECIFIED: Status: ACTIVE | Noted: 2019-05-23

## 2019-10-16 PROBLEM — S32.509A PUBIC BONE FRACTURE: Status: ACTIVE | Noted: 2019-05-23

## 2019-10-17 ENCOUNTER — APPOINTMENT (OUTPATIENT)
Dept: INTERNAL MEDICINE | Facility: HOME HEALTH | Age: 84
End: 2019-10-17
Payer: MEDICARE

## 2019-10-17 VITALS
HEIGHT: 57 IN | BODY MASS INDEX: 25.89 KG/M2 | RESPIRATION RATE: 18 BRPM | WEIGHT: 120 LBS | DIASTOLIC BLOOD PRESSURE: 80 MMHG | OXYGEN SATURATION: 96 % | SYSTOLIC BLOOD PRESSURE: 130 MMHG | HEART RATE: 70 BPM | TEMPERATURE: 97.6 F

## 2019-10-17 DIAGNOSIS — K21.9 GASTRO-ESOPHAGEAL REFLUX DISEASE W/OUT ESOPHAGITIS: ICD-10-CM

## 2019-10-17 DIAGNOSIS — K59.00 CONSTIPATION, UNSPECIFIED: ICD-10-CM

## 2019-10-17 DIAGNOSIS — I25.10 ATHEROSCLEROTIC HEART DISEASE OF NATIVE CORONARY ARTERY W/OUT ANGINA PECTORIS: ICD-10-CM

## 2019-10-17 DIAGNOSIS — G47.00 INSOMNIA, UNSPECIFIED: ICD-10-CM

## 2019-10-17 DIAGNOSIS — S32.509A UNSPECIFIED FRACTURE OF UNSPECIFIED PUBIS, INITIAL ENCOUNTER FOR CLOSED FRACTURE: ICD-10-CM

## 2019-10-17 PROCEDURE — 99349 HOME/RES VST EST MOD MDM 40: CPT

## 2019-10-17 NOTE — REVIEW OF SYSTEMS
[Joint Pain] : joint pain [Joint Stiffness] : joint stiffness [Joint Swelling] : joint swelling [Unsteady Walking] : ataxia [Depression] : depression [Negative] : Heme/Lymph [Insomnia] : insomnia [Palpitations] : no palpitations [Shortness Of Breath] : no shortness of breath [Chest Pain] : no chest pain [Cough] : no cough [Abdominal Pain] : no abdominal pain [Constipation] : no constipation [de-identified] : On Celexa, agitation [FreeTextEntry2] : Frail elderly female seen sitting in chair without cardiovascular or respiratory distress noted

## 2019-10-17 NOTE — CHRONIC CARE ASSESSMENT
[PPS Score: ____] : Palliative Performance Scale (PPS) Score: [unfilled] [FAST Score: ____] : Functional Assessment Scale (FAST) Score: [unfilled] [Current] : Current Pain: [Oriented To Person] : ~L oriented to person [Oriented To Place] : ~L oriented to place [Oriented To Time] : ~L oriented to time [Alert] : ~L alert [Reviewed Mini-Cog Outcomes from Comprehensive Assessment] : Reviewed Mini-Cog outcomes from comprehensive assessment [Recent decline in ADLs or iADLs] : Recent decline in ADLs or IADLs [Reviewed Fall Risk from Comprehensive Assessment] : Reviewed fall risk from comprehensive assessment [No Action Needed] : No action needed [Safety elements used in home] : No safety elements used in home [Reports changes in vision] : Reports no changes in vision [Reports changes in hearing] : reports no changes in hearing [de-identified] : Receives 24 home health care services [de-identified] : Abdominal related to constipation.  [de-identified] : Currently on Celexa [TWNoteComboBox1] : Mini-Cog Outcome: Positive: 0 recalled words

## 2019-10-17 NOTE — COUNSELING
[Normal Weight - ( BMI  <25 )] : normal weight - ( BMI  <25 ) [High cholesterol self management education material provided] : high cholesterol self management education material provided [Hypertension self management education material provided] : hypertension self management education material provided [Non - Smoker] : non-smoker [Medical alert] : medical alert [] : foot exam [Improve mobility] : improve mobility [Decrease hospital use] : decrease hospital use [Maintain functional ability] : maintain functional ability [Discussed disease trajectory with patient/caregiver] : discussed disease trajectory with patient/caregiver [Patient/Caregiver not ready to engage in discussion] : patient/caregiver not ready to engage in discussion [Advanced Directives discussed: ____] : Advanced directives discussed: [unfilled] [DNR] : Code Status: DNR [Limited] : Treatment Guidelines: Limited [DNI] : Intubation: DNI [[facility]: _____[facility]last verification date: [unfilled] [_____] : HCP: [unfilled] [FreeTextEntry4] : Educated patient/legal representative about CCM services and consent.\par Educated patient/legal representative that this service is available because they have 2 or more chronic conditions, that only one Provider can furnish CCM Services per calendar month and that CCM services are subject to the usual Medicare deductible and coinsurance. \par Patient/legal representative understands the right to stop the CCM services at any time by notifying [facility] office.\par Patient/legal representative has verbally consented (y/n):YES\par \par

## 2019-10-17 NOTE — PHYSICAL EXAM
[No Acute Distress] : no acute distress [Well Nourished] : well nourished [Well Developed] : well developed [Normal Sclera/Conjunctiva] : normal sclera/conjunctiva [EOMI] : extra ocular movement intact [Normal Outer Ear/Nose] : the ears and nose were normal in appearance [Normal Oropharynx] : the oropharynx was normal [No JVD] : no jugular venous distention [No Respiratory Distress] : no respiratory distress [Clear to Auscultation] : lungs were clear to auscultation bilaterally [No Accessory Muscle Use] : no accessory muscle use [Normal Rate] : heart rate was normal  [Regular Rhythm] : with a regular rhythm [Normal S1, S2] : normal S1 and S2 [No Murmurs] : no murmurs heard [No Edema] : there was no peripheral edema [Normal Bowel Sounds] : normal bowel sounds [Non Tender] : non-tender [Soft] : abdomen soft [Not Distended] : not distended [Normal Post Cervical Nodes] : no posterior cervical lymphadenopathy [Normal Anterior Cervical Nodes] : no anterior cervical lymphadenopathy [No CVA Tenderness] : no ~M costovertebral angle tenderness [No Spinal Tenderness] : no spinal tenderness [Kyphosis] :  kyphosis present [No Rash] : no rash [Cranial Nerves Intact] : cranial nerves 2-12 were intact [Oriented x3] : oriented to person, place, and time [Normal Affect] : the affect was normal [de-identified] : dry skin [de-identified] : Agitated refusing to be seen, Pulling sheets over her head.   [de-identified] : Frail elderly women sitting in the chair, pleasant with no cardiovascular or respiratory distress noted during examination

## 2019-10-17 NOTE — REASON FOR VISIT
[Follow-Up] : a follow-up visit [Formal Caregiver] : formal caregiver [Pre-Visit Preparation] : pre-visit preparation was done [FreeTextEntry1] : Dementia with behavioral disturbance, impaired gait unable to leave home.  right superior pubic fracture.   [FreeTextEntry2] : medication, labs, orders.

## 2019-10-17 NOTE — HISTORY OF PRESENT ILLNESS
[Patient] : patient [Family Member] : family member [FreeTextEntry1] : Dementia with behavioral disturbance, impaired gait unable to leave home.  right superior pubic fracture.   [FreeTextEntry2] : ELIAZAR CERRATO is an 89 year with arthritis of bilateral lower extremities, dementia with behavioral disturbance, depression gastroesophageal reflux disease,CAD with PCI, hyperlipidemia, hypertonicity of bladder overactive bladder seen today for follow up visit\par \par on last visit family reports patient waking up at night.  Would like to try a sleeping pill.  No longer interested in physical therapy.  \par \par Currently Ms. Cerrato has been more agitated and not sleeping.  No cough or urinary symptoms.  \par \par Patient/ patient's caregiver reports no weight loss >10lbs in the past 6 months. No changes in dentition or swallowing reported, No changes in hearing or vision reported. No changes in Cognition reported. Patient denies any symptoms of depression or anxiety. Patient is ADL and IADL dependent. No changes in gait or falls reported. \par Patient's home environment is safe. \par \par \par

## 2019-11-19 ENCOUNTER — APPOINTMENT (OUTPATIENT)
Dept: INTERNAL MEDICINE | Facility: HOME HEALTH | Age: 84
End: 2019-11-19

## 2019-11-22 ENCOUNTER — RX CHANGE (OUTPATIENT)
Age: 84
End: 2019-11-22

## 2019-11-22 ENCOUNTER — MOBILE ON CALL (OUTPATIENT)
Age: 84
End: 2019-11-22

## 2019-12-06 ENCOUNTER — APPOINTMENT (OUTPATIENT)
Dept: INTERNAL MEDICINE | Facility: HOME HEALTH | Age: 84
End: 2019-12-06
Payer: MEDICARE

## 2019-12-06 VITALS
WEIGHT: 118 LBS | RESPIRATION RATE: 18 BRPM | BODY MASS INDEX: 25.46 KG/M2 | HEIGHT: 57 IN | HEART RATE: 74 BPM | OXYGEN SATURATION: 97 % | TEMPERATURE: 97.6 F

## 2019-12-06 PROCEDURE — 99495 TRANSJ CARE MGMT MOD F2F 14D: CPT

## 2019-12-06 RX ORDER — DIAZEPAM 5 MG/1
5 TABLET ORAL
Qty: 30 | Refills: 2 | Status: DISCONTINUED | COMMUNITY
Start: 2019-10-17 | End: 2019-12-06

## 2019-12-06 RX ORDER — RISPERIDONE 0.5 MG/1
0.5 TABLET, FILM COATED ORAL
Qty: 30 | Refills: 3 | Status: DISCONTINUED | COMMUNITY
Start: 2019-11-22 | End: 2019-12-06

## 2019-12-06 NOTE — CURRENT MEDS
[High Risk Medications Reviewed and Reconciled (Beers Criteria)] : high risk medications reviewed and reconciled [Compliant with medications] : Patient is compliant with medications [Medication and Allergies Reconciled] : medication and allergies reconciled

## 2019-12-06 NOTE — HISTORY OF PRESENT ILLNESS
[Patient] : patient [Formal Caregiver] : formal caregiver [FreeTextEntry1] : Dementia with behavioral disturbance, impaired gait unable to leave home.  right superior pubic fracture.   [FreeTextEntry2] : ELIAZAR CERRATO is being seen after discharge home from [facility]. She was admitted on 11/22/19 for increased agitation/confusion and discharged home on 12/05/2019. Discharge medications from the discharge summary were reviewed and reconciled with the current medication list.  Found to have a UTI- treated and discharged home.\par \par Patient/caregiver reports that patient is adjusting to being home. Continue to refuse to eat and drink. Remains paranoid.  Taking Haldol .5 mg bid PO as prescribed in the hospital.  Laying in bed refusing to be seen pushing people away. \par Caregiver support is as follows: [facility].  Hospital providing patient with an agency for additional services.  Awaiting visit from the agency.  Consider hospice if needs are not met with the hospital agency. Discussed with her son Tonio Cerrato\par \par Patient/ patient's caregiver reports no weight loss >10lbs in the past 6 months. No changes in dentition or swallowing reported, No changes in hearing or vision reported. No changes in Cognition reported. Patient denies any symptoms of depression or anxiety. Patient is ADL and IADL dependent. No changes in gait or falls reported. \par Patient's home environment is safe. \par \par \par

## 2019-12-06 NOTE — REVIEW OF SYSTEMS
[Joint Pain] : joint pain [Joint Stiffness] : joint stiffness [Joint Swelling] : joint swelling [Unsteady Walking] : ataxia [Insomnia] : insomnia [Depression] : depression [Negative] : Heme/Lymph [Chest Pain] : no chest pain [Palpitations] : no palpitations [Shortness Of Breath] : no shortness of breath [Cough] : no cough [Abdominal Pain] : no abdominal pain [Constipation] : no constipation [FreeTextEntry2] : Frail elderly female seen laying in the bed refusing to be seen. [FreeTextEntry9] : refusing to ambulate.  laying in bed [de-identified] : On Celexa, agitation, Haldol from hospital

## 2019-12-10 ENCOUNTER — MOBILE ON CALL (OUTPATIENT)
Age: 84
End: 2019-12-10

## 2019-12-12 ENCOUNTER — OTHER (OUTPATIENT)
Age: 84
End: 2019-12-12

## 2019-12-12 DIAGNOSIS — N39.0 URINARY TRACT INFECTION, SITE NOT SPECIFIED: ICD-10-CM

## 2019-12-17 ENCOUNTER — APPOINTMENT (OUTPATIENT)
Dept: INTERNAL MEDICINE | Facility: HOME HEALTH | Age: 84
End: 2019-12-17
Payer: MEDICARE

## 2019-12-17 DIAGNOSIS — S99.922A UNSPECIFIED INJURY OF LEFT FOOT, INITIAL ENCOUNTER: ICD-10-CM

## 2019-12-17 PROCEDURE — 99348 HOME/RES VST EST LOW MDM 30: CPT

## 2019-12-17 NOTE — COUNSELING
[Normal Weight - ( BMI  <25 )] : normal weight - ( BMI  <25 ) [High cholesterol self management education material provided] : high cholesterol self management education material provided [Hypertension self management education material provided] : hypertension self management education material provided [Non - Smoker] : non-smoker [Medical alert] : medical alert [] : eye exam [Improve mobility] : improve mobility [Decrease hospital use] : decrease hospital use [Discussed disease trajectory with patient/caregiver] : discussed disease trajectory with patient/caregiver [Maintain functional ability] : maintain functional ability [Advanced Directives discussed: ____] : Advanced directives discussed: [unfilled] [Patient/Caregiver not ready to engage in discussion] : patient/caregiver not ready to engage in discussion [DNR] : Code Status: DNR [Limited] : Treatment Guidelines: Limited [[facility]: _____[facility]last [facility]: [unfilled] [DNI] : Intubation: DNI [_____] : HCP: [unfilled] [FreeTextEntry4] : Educated patient/legal representative about CCM services and consent.\par Educated patient/legal representative that this service is available because they have 2 or more chronic conditions, that only one Provider can furnish CCM Services per calendar month and that CCM services are subject to the usual Medicare deductible and coinsurance. \par Patient/legal representative understands the right to stop the CCM services at any time by notifying [facility] office.\par Patient/legal representative has verbally consented (y/n):YES\par \par

## 2019-12-17 NOTE — REVIEW OF SYSTEMS
[Joint Pain] : joint pain [Joint Swelling] : joint swelling [Joint Stiffness] : joint stiffness [Insomnia] : insomnia [Unsteady Walking] : ataxia [Depression] : depression [Negative] : Heme/Lymph [Palpitations] : no palpitations [Chest Pain] : no chest pain [Shortness Of Breath] : no shortness of breath [Cough] : no cough [Abdominal Pain] : no abdominal pain [de-identified] : On Celexa, agitation [FreeTextEntry2] : Frail elderly female seen sitting in chair without cardiovascular or respiratory distress noted [Constipation] : no constipation

## 2019-12-17 NOTE — SURGICAL HISTORY
Attempt to contact patient to schedule appt for IUD removal.  No answer and vm not set up to leave message.  Will await patient callback.  Patient not set up with [employer]   [PSH Reviewed and Updated] : past surgical history reviewed and updated

## 2019-12-17 NOTE — CHRONIC CARE ASSESSMENT
[PPS Score: ____] : Palliative Performance Scale (PPS) Score: [unfilled] [FAST Score: ____] : Functional Assessment Scale (FAST) Score: [unfilled] [Current] : Current Pain: [Oriented To Person] : ~L oriented to person [Oriented To Place] : ~L oriented to place [Alert] : ~L alert [Oriented To Time] : ~L oriented to time [Recent decline in ADLs or iADLs] : Recent decline in ADLs or IADLs [Reviewed Mini-Cog Outcomes from Comprehensive Assessment] : Reviewed Mini-Cog outcomes from comprehensive assessment [Reviewed Fall Risk from Comprehensive Assessment] : Reviewed fall risk from comprehensive assessment [No Action Needed] : No action needed [Safety elements used in home] : No safety elements used in home [Reports changes in vision] : Reports no changes in vision [de-identified] : Abdominal related to constipation.  [Reports changes in hearing] : reports no changes in hearing [de-identified] : Currently on Celexa [de-identified] : Receives 24 home health care services [TWNoteComboBox1] : Mini-Cog Outcome: Positive: 0 recalled words

## 2019-12-17 NOTE — HISTORY OF PRESENT ILLNESS
[Patient] : patient [Family Member] : family member [FreeTextEntry1] : Dementia with behavioral disturbance, impaired gait unable to leave home.  right superior pubic fracture.   [FreeTextEntry2] : ELIAZAR CERRATO is an 89 year with arthritis of bilateral lower extremities, dementia with behavioral disturbance, depression gastroesophageal reflux disease,CAD with PCI, hyperlipidemia, hypertonicity of bladder overactive bladder seen today for foot trauma Left\par \par  Ms. Cerrato was more agitated and not sleeping.  No cough or urinary symptoms.  \par \par Currently less agitated since she started Haldol.  On the weekend patient hit her foot on the walker.  With increased pain.  \par \par Patient/ patient's caregiver reports no weight loss >10lbs in the past 6 months. No changes in dentition or swallowing reported, No changes in hearing or vision reported. No changes in Cognition reported. Patient denies any symptoms of depression or anxiety. Patient is ADL and IADL dependent. No changes in gait or falls reported. \par Patient's home environment is safe. \par \par \par

## 2019-12-17 NOTE — PHYSICAL EXAM
[No Acute Distress] : no acute distress [Well Nourished] : well nourished [Well Developed] : well developed [Normal Sclera/Conjunctiva] : normal sclera/conjunctiva [Normal Outer Ear/Nose] : the ears and nose were normal in appearance [EOMI] : extra ocular movement intact [Normal Oropharynx] : the oropharynx was normal [No JVD] : no jugular venous distention [No Respiratory Distress] : no respiratory distress [No Accessory Muscle Use] : no accessory muscle use [Clear to Auscultation] : lungs were clear to auscultation bilaterally [Normal Rate] : heart rate was normal  [Regular Rhythm] : with a regular rhythm [Normal S1, S2] : normal S1 and S2 [No Murmurs] : no murmurs heard [No Edema] : there was no peripheral edema [Normal Bowel Sounds] : normal bowel sounds [Non Tender] : non-tender [Soft] : abdomen soft [Not Distended] : not distended [Normal Post Cervical Nodes] : no posterior cervical lymphadenopathy [Normal Anterior Cervical Nodes] : no anterior cervical lymphadenopathy [No CVA Tenderness] : no ~M costovertebral angle tenderness [No Spinal Tenderness] : no spinal tenderness [Kyphosis] :  kyphosis present [No Rash] : no rash [Cranial Nerves Intact] : cranial nerves 2-12 were intact [Oriented x3] : oriented to person, place, and time [Normal Affect] : the affect was normal [de-identified] : Frail elderly women sitting in the chair, pleasant with no cardiovascular or respiratory distress noted during examination [de-identified] : Left foot with bruises and some edema.  Tenderness to touch.   [de-identified] : dry skin [de-identified] : Agitated refusing to be seen, Pulling sheets over her head.

## 2019-12-19 LAB
APPEARANCE: CLEAR
BILIRUBIN URINE: NEGATIVE
BLOOD URINE: NEGATIVE
COLOR: YELLOW
GLUCOSE QUALITATIVE U: NEGATIVE
KETONES URINE: NEGATIVE
LEUKOCYTE ESTERASE URINE: NEGATIVE
NITRITE URINE: NEGATIVE
PH URINE: 5.5
PROTEIN URINE: NEGATIVE
SPECIFIC GRAVITY URINE: >=1.03
UROBILINOGEN URINE: NORMAL

## 2019-12-20 ENCOUNTER — RX RENEWAL (OUTPATIENT)
Age: 84
End: 2019-12-20

## 2020-01-23 ENCOUNTER — APPOINTMENT (OUTPATIENT)
Dept: INTERNAL MEDICINE | Facility: HOME HEALTH | Age: 85
End: 2020-01-23

## 2020-02-03 RX ORDER — HALOPERIDOL 0.5 MG/1
0.5 TABLET ORAL
Qty: 60 | Refills: 3 | Status: ACTIVE | COMMUNITY
Start: 2019-12-06 | End: 1900-01-01

## 2020-02-09 DIAGNOSIS — R41.0 DISORIENTATION, UNSPECIFIED: ICD-10-CM

## 2020-02-10 PROBLEM — R41.0 ACUTE DELIRIUM: Status: ACTIVE | Noted: 2020-02-10

## 2020-02-12 PROBLEM — R29.6 FALLS FREQUENTLY: Status: ACTIVE | Noted: 2019-05-23

## 2020-02-12 PROBLEM — R54 FRAIL ELDERLY: Status: ACTIVE | Noted: 2019-05-23

## 2020-02-12 PROBLEM — F32.0 CURRENT MILD EPISODE OF MAJOR DEPRESSIVE DISORDER WITHOUT PRIOR EPISODE: Status: ACTIVE | Noted: 2019-05-23

## 2020-02-12 PROBLEM — F03.91 DEMENTIA WITH BEHAVIORAL DISTURBANCE: Status: ACTIVE | Noted: 2019-05-23

## 2020-02-12 PROBLEM — Z71.89 ADVANCE CARE PLANNING: Status: ACTIVE | Noted: 2019-05-23

## 2020-02-12 PROBLEM — I10 HYPERTENSION: Status: ACTIVE | Noted: 2019-05-23

## 2020-02-12 PROBLEM — E78.5 HYPERLIPIDEMIA, UNSPECIFIED HYPERLIPIDEMIA TYPE: Status: ACTIVE | Noted: 2019-05-23

## 2020-02-13 ENCOUNTER — APPOINTMENT (OUTPATIENT)
Dept: HOME HEALTH SERVICES | Facility: HOME HEALTH | Age: 85
End: 2020-02-13
Payer: MEDICARE

## 2020-02-13 VITALS
BODY MASS INDEX: 25.46 KG/M2 | OXYGEN SATURATION: 98 % | SYSTOLIC BLOOD PRESSURE: 136 MMHG | HEART RATE: 71 BPM | WEIGHT: 118 LBS | TEMPERATURE: 98 F | HEIGHT: 57 IN | RESPIRATION RATE: 18 BRPM | DIASTOLIC BLOOD PRESSURE: 80 MMHG

## 2020-02-13 DIAGNOSIS — I63.81 OTHER CEREBRAL INFARCTION DUE TO OCCLUSION OR STENOSIS OF SMALL ARTERY: ICD-10-CM

## 2020-02-13 DIAGNOSIS — Z71.89 OTHER SPECIFIED COUNSELING: ICD-10-CM

## 2020-02-13 DIAGNOSIS — R54 AGE-RELATED PHYSICAL DEBILITY: ICD-10-CM

## 2020-02-13 DIAGNOSIS — F32.0 MAJOR DEPRESSIVE DISORDER, SINGLE EPISODE, MILD: ICD-10-CM

## 2020-02-13 DIAGNOSIS — R42 DIZZINESS AND GIDDINESS: ICD-10-CM

## 2020-02-13 DIAGNOSIS — R29.6 REPEATED FALLS: ICD-10-CM

## 2020-02-13 DIAGNOSIS — I10 ESSENTIAL (PRIMARY) HYPERTENSION: ICD-10-CM

## 2020-02-13 DIAGNOSIS — F03.91 UNSPECIFIED DEMENTIA WITH BEHAVIORAL DISTURBANCE: ICD-10-CM

## 2020-02-13 DIAGNOSIS — E78.5 HYPERLIPIDEMIA, UNSPECIFIED: ICD-10-CM

## 2020-02-13 DIAGNOSIS — I67.89 OTHER CEREBROVASCULAR DISEASE: ICD-10-CM

## 2020-02-13 DIAGNOSIS — L89.91 PRESSURE ULCER OF UNSPECIFIED SITE, STAGE 1: ICD-10-CM

## 2020-02-13 PROCEDURE — G0506: CPT

## 2020-02-13 PROCEDURE — 99497 ADVNCD CARE PLAN 30 MIN: CPT

## 2020-02-13 PROCEDURE — 99350 HOME/RES VST EST HIGH MDM 60: CPT

## 2020-02-13 RX ORDER — ASPIRIN 81 MG/1
81 TABLET ORAL DAILY
Refills: 0 | Status: DISCONTINUED | COMMUNITY
Start: 2019-05-23 | End: 2020-02-13

## 2020-02-13 NOTE — COUNSELING
[Normal Weight - ( BMI  <25 )] : normal weight - ( BMI  <25 ) [High cholesterol self management education material provided] : high cholesterol self management education material provided [Hypertension self management education material provided] : hypertension self management education material provided [Non - Smoker] : non-smoker [Medical alert] : medical alert [] : foot exam [Improve mobility] : improve mobility [Decrease hospital use] : decrease hospital use [Maintain functional ability] : maintain functional ability [Discussed disease trajectory with patient/caregiver] : discussed disease trajectory with patient/caregiver [Patient/Caregiver not ready to engage in discussion] : patient/caregiver not ready to engage in discussion [Advanced Directives discussed: ____] : Advanced directives discussed: [unfilled] [DNR] : Code Status: DNR [Limited] : Treatment Guidelines: Limited [DNI] : Intubation: DNI [[facility]: _____] [facility]last verification date: [unfilled] [_____] : HCP: [unfilled] [ - New patient with 2 or more chronic conditions; CCM discussed and patient-centered care plan established] : New patient with 2 or more chronic conditions; CCM discussed and patient-centered care plan established [FreeTextEntry4] : Educated patient/legal representative about CCM services and consent.\par Educated patient/legal representative that this service is available because they have 2 or more chronic conditions, that only one Provider can furnish CCM Services per calendar month and that CCM services are subject to the usual Medicare deductible and coinsurance. \par Patient/legal representative understands the right to stop the CCM services at any time by notifying [facility] office.\par Patient/legal representative has verbally consented (y/n):YES\par \par

## 2020-02-13 NOTE — REVIEW OF SYSTEMS
[Joint Pain] : joint pain [Joint Stiffness] : joint stiffness [Joint Swelling] : joint swelling [Unsteady Walking] : ataxia [Insomnia] : insomnia [Depression] : depression [Negative] : Heme/Lymph [Chest Pain] : no chest pain [Palpitations] : no palpitations [Shortness Of Breath] : no shortness of breath [Cough] : no cough [Abdominal Pain] : no abdominal pain [Constipation] : no constipation [FreeTextEntry2] : Frail elderly female seen lying in bed [de-identified] : On Celexa, agitation

## 2020-02-13 NOTE — HISTORY OF PRESENT ILLNESS
[Patient] : patient [Family Member] : family member [FreeTextEntry1] : Dementia with behavioral disturbance, impaired gait unable to leave home.  right superior pubic fracture.   [FreeTextEntry2] : ELIAZAR HE is an 89 year with arthritis of bilateral lower extremities, dementia with behavioral disturbance, depression gastroesophageal reflux disease,CAD with PCI, hyperlipidemia, hypertonicity of bladder overactive bladder seen today for follow up visit\par \par on last visit family reported patient has been more agitated and not sleeping.  No cough or urinary symptoms.  \par \par Currently HHA reports decline not walking or moving with poor balance.  Unable to hold spoon or water bottle. Appetite variable.  Paramedics contacted on Sunday received IV for dehydration.   Weak and tired.  Left heel lesion and medial maleoli.  \par \par Patient/ patient's caregiver reports no weight loss >10lbs in the past 6 months. No changes in dentition or swallowing reported, No changes in hearing or vision reported. No changes in Cognition reported. Patient denies any symptoms of depression or anxiety. Patient is ADL and IADL dependent. No changes in gait or falls reported. \par Patient's home environment is safe. \par \par \par

## 2020-02-13 NOTE — PHYSICAL EXAM
[No Acute Distress] : no acute distress [Well Nourished] : well nourished [Well Developed] : well developed [Normal Sclera/Conjunctiva] : normal sclera/conjunctiva [EOMI] : extra ocular movement intact [Normal Outer Ear/Nose] : the ears and nose were normal in appearance [Normal Oropharynx] : the oropharynx was normal [No JVD] : no jugular venous distention [No Respiratory Distress] : no respiratory distress [Clear to Auscultation] : lungs were clear to auscultation bilaterally [No Accessory Muscle Use] : no accessory muscle use [Normal Rate] : heart rate was normal  [Regular Rhythm] : with a regular rhythm [Normal S1, S2] : normal S1 and S2 [No Murmurs] : no murmurs heard [No Edema] : there was no peripheral edema [Normal Bowel Sounds] : normal bowel sounds [Non Tender] : non-tender [Soft] : abdomen soft [Not Distended] : not distended [Normal Post Cervical Nodes] : no posterior cervical lymphadenopathy [Normal Anterior Cervical Nodes] : no anterior cervical lymphadenopathy [No CVA Tenderness] : no ~M costovertebral angle tenderness [No Spinal Tenderness] : no spinal tenderness [Kyphosis] :  kyphosis present [No Rash] : no rash [Cranial Nerves Intact] : cranial nerves 2-12 were intact [Oriented x3] : oriented to person, place, and time [Normal Affect] : the affect was normal [de-identified] : Frail elderly women lying bed.  Pushing team away.  In fetal position.  "Just leave me alone!" [de-identified] : Pressure ulcer stage I on medial malleoli and heel.  No open areas.  No warmth or drainage.  [de-identified] : Agitated refusing to be seen, Pulling sheets over her head.

## 2020-02-13 NOTE — CHRONIC CARE ASSESSMENT
[Limited decision making ability] : limited decision making ability [PPS Score: ____] : Palliative Performance Scale (PPS) Score: [unfilled] [FAST Score: ____] : Functional Assessment Scale (FAST) Score: [unfilled] [Current] : Current Pain: [Oriented To Person] : ~L oriented to person [Oriented To Place] : ~L oriented to place [Oriented To Time] : ~L oriented to time [Alert] : ~L alert [Reviewed Mini-Cog Outcomes from Comprehensive Assessment] : Reviewed Mini-Cog outcomes from comprehensive assessment [Recent decline in ADLs or iADLs] : Recent decline in ADLs or IADLs [Reviewed Fall Risk from Comprehensive Assessment] : Reviewed fall risk from comprehensive assessment [No Action Needed] : No action needed [de-identified] : Declining unable to ambulate [de-identified] : As tolerated  [Safety elements used in home] : No safety elements used in home [Reports changes in hearing] : reports no changes in hearing [Reports changes in vision] : Reports no changes in vision [de-identified] : Abdominal related to constipation.  [de-identified] : Currently on Celexa [de-identified] : Receives 24 home health care services [TWNoteComboBox1] : Mini-Cog Outcome: Positive: 0 recalled words

## 2020-02-14 LAB
BASOPHILS # BLD AUTO: 0.05 K/UL
BASOPHILS NFR BLD AUTO: 0.5 %
EOSINOPHIL # BLD AUTO: 0.02 K/UL
EOSINOPHIL NFR BLD AUTO: 0.2 %
HCT VFR BLD CALC: 41.9 %
HGB BLD-MCNC: 14 G/DL
IMM GRANULOCYTES NFR BLD AUTO: 0.8 %
LYMPHOCYTES # BLD AUTO: 1.69 K/UL
LYMPHOCYTES NFR BLD AUTO: 15.7 %
MAN DIFF?: NORMAL
MCHC RBC-ENTMCNC: 30.6 PG
MCHC RBC-ENTMCNC: 33.4 GM/DL
MCV RBC AUTO: 91.5 FL
MONOCYTES # BLD AUTO: 1.08 K/UL
MONOCYTES NFR BLD AUTO: 10.1 %
NEUTROPHILS # BLD AUTO: 7.81 K/UL
NEUTROPHILS NFR BLD AUTO: 72.7 %
PLATELET # BLD AUTO: 248 K/UL
RBC # BLD: 4.58 M/UL
RBC # FLD: 11.9 %
WBC # FLD AUTO: 10.74 K/UL

## 2020-02-15 LAB
ALBUMIN SERPL ELPH-MCNC: 3.1 G/DL
ALP BLD-CCNC: 106 U/L
ALT SERPL-CCNC: 8 U/L
ANION GAP SERPL CALC-SCNC: 16 MMOL/L
AST SERPL-CCNC: 28 U/L
BILIRUB SERPL-MCNC: 0.9 MG/DL
BUN SERPL-MCNC: 14 MG/DL
CALCIUM SERPL-MCNC: 8.9 MG/DL
CHLORIDE SERPL-SCNC: 102 MMOL/L
CO2 SERPL-SCNC: 19 MMOL/L
CREAT SERPL-MCNC: 0.8 MG/DL
GLUCOSE SERPL-MCNC: 88 MG/DL
POTASSIUM SERPL-SCNC: 4.3 MMOL/L
PROT SERPL-MCNC: 5.8 G/DL
SODIUM SERPL-SCNC: 137 MMOL/L

## 2020-10-01 PROBLEM — I67.89 CEREBRAL MICROVASCULAR DISEASE: Status: ACTIVE | Noted: 2019-05-23

## 2023-08-20 NOTE — CURRENT MEDS
Pharmacist Admission Medication History    Admission medication history is complete. The information provided in this note is only as accurate as the sources available at the time of the update.    Medication reconciliation/reorder completed by provider prior to medication history? No    Information Source(s): Hospital records, Facility (U/NH/) medication list/MAR, and CareEverywhere/SureScripts via N/A    Pertinent Information: Milly was admitted at [facility] from 8/11 to 8/17. A pharmacist did a medication history on admission (see note from 8/11/23) at [facility]. Patient transferred directly here on 8/17. I reviewed discharge orders and MAR from [facility].    Changes made to PTA medication list:  Added: None  Deleted: None  Changed: None    Medication Affordability:       Allergies reviewed with patient and updates made in EHR: no    Medication History Completed By: Paramjit Polk [facility] 8/20/2023 2:58 PM    Prior to Admission medications    Medication Sig Last Dose Taking? Auth Provider Long Term End Date   acetaminophen (TYLENOL) 325 MG tablet Take 2 tablets (650 mg) by mouth every 4 hours as needed for mild pain 8/17/2023 Yes Sofy Dawkins MD     amLODIPine (NORVASC) 10 MG tablet Take 1 tablet (10 mg) by mouth daily 8/17/2023 Yes Sofy Dawkins MD Yes    artificial saliva (BIOTENE MT) SOLN solution Swish and spit 2 mLs (2 sprays) in mouth 4 times daily 8/14/2023 Yes Sofy Dawkins MD     carvedilol (COREG) 25 MG tablet Take 1 tablet (25 mg) by mouth 2 times daily (with meals) 8/17/2023 Yes Sofy Dawkins MD Yes    ferrous sulfate (FEROSUL) 325 (65 Fe) MG tablet Take 1 tablet (325 mg) by mouth daily (with breakfast) 8/17/2023 Yes Clint Gu MD     folic acid (FOLVITE) 1 MG tablet Take 1 mg by mouth daily 8/17/2023 Yes Reported, Patient     hydrALAZINE (APRESOLINE) 50 MG tablet Take 1 tablet (50 mg) by mouth every 8 hours 8/17/2023 Yes Sofy Dawkins  MD Jayda Yes    HYDROmorphone (DILAUDID) 0.2 MG/ML SOLN injection' Inject 1 mL (0.2 mg) into the vein every 3 hours as needed for severe pain  at prn Yes Sofy Dawkins MD     HYDROmorphone (DILAUDID) 2 MG tablet Take 0.5 tablets (1 mg) by mouth every 4 hours as needed for moderate pain 8/15/2023 Yes Sofy Dawkins MD     isosorbide mononitrate (IMDUR) 60 MG 24 hr tablet Take 1 tablet (60 mg) by mouth daily 8/17/2023 Yes Sofy Dawkins MD Yes    labetalol (NORMODYNE/TRANDATE) 5 MG/ML injection Inject 4 mLs (20 mg) into the vein every 2 hours as needed for high blood pressure (give for SBP > 180) 8/17/2023 Yes Sofy Dawkins MD Yes    lisinopril (ZESTRIL) 40 MG tablet Take 1 tablet (40 mg) by mouth daily 8/17/2023 Yes Sofy Dawkins MD Yes    metoprolol succinate ER (TOPROL XL) 50 MG 24 hr tablet Take 50 mg by mouth daily 8/11/2023 Yes Reported, Patient Yes    mycophenolate (GENERIC EQUIVALENT) 500 MG tablet Take 3 tablets (1,500 mg) by mouth 2 times daily 8/17/2023 Yes Sofy Dawkins MD Yes    ondansetron (ZOFRAN ODT) 4 MG ODT tab Take 4 mg by mouth every 8 hours as needed for nausea 8/11/2023 Yes Reported, Patient     pantoprazole (PROTONIX) 40 MG EC tablet Take 1 tablet (40 mg) by mouth every morning (before breakfast) 8/17/2023 Yes Sofy Dawkins MD No    piperacillin-tazobactam (ZOSYN) 3-0.375 GM vial Inject 3.375 g into the vein every 6 hours 8/17/2023 Yes Sofy Dawkins MD     potassium chloride ER (KLOR-CON M) 20 MEQ CR tablet Take 20 mEq by mouth 2 times daily 8/17/2023 Yes Reported, Patient     predniSONE (DELTASONE) 10 MG tablet Take 3 tablets (30 mg) by mouth daily 8/17/2023 Yes Sofy Dawkins MD     spironolactone (ALDACTONE) 25 MG tablet Take 1 tablet (25 mg) by mouth daily 8/17/2023 Yes Sofy Dawkins MD Yes    sulfamethoxazole-trimethoprim (BACTRIM) 400-80 MG tablet Take 1 tablet by mouth  daily 8/17/2023 Yes Sofy Dawkins MD No    vancomycin (VANCOCIN) 125 MG capsule Take 1 capsule (125 mg) by mouth 2 times daily 8/17/2023 Yes Sofy Dawkins MD No    Vitamin D, Cholecalciferol, 10 MCG (400 UNIT) TABS Take 10 mcg by mouth daily  Yes Reported, Patient No    potassium chloride ER (KLOR-CON M) 20 MEQ CR tablet Take 1 tablet (20 mEq) by mouth 3 times daily   April Purvis MD         [High Risk Medications Reviewed and Reconciled (Beers Criteria)] : high risk medications reviewed and reconciled [Medication and Allergies Reconciled] : medication and allergies reconciled [Compliant with medications] : Patient is compliant with medications

## 2023-10-01 PROBLEM — I63.81 LACUNAR INFARCTION: Status: ACTIVE | Noted: 2019-05-23

## 2024-01-16 NOTE — PHYSICAL EXAM
AC-1000 American Core Series applied    1. N-002B 2.5 pet Nickelsulfate hexahydrate  2. A-004 50.0 pet Amerchol L 101  3. N-001 20.0 pet Neomycin sulfate  4. P-014B 0.25 pet Potassium dichromate  5. D-047B 1.0 pet DMDM Hydantoin  6. MX-07 8.0 pet Fragrance mix  7. C-020 20.0 pet Colophonium / (Colophony)  8. MX-03A 12.0 pet Paraben Mix  9. M-035B 0.2 aq METHYLISOTHIAZOLINONE  10. B-001 25.0 pet Myroxylon pereirae resin / (Balsam [place]) +2  11. E-005 1.0 pet Ethylenediamine dihydrochloride  12. C-017A 1.0 pet Cobalt(II) chloride hexahydrate  13. B-024 1.0 pet 9-ckbp-Afmasdenqsxulwjahwfnhel resin  14. E-002 1.0 pet Epoxy resin Bisphenol A  15. MX-06 3.0 pet Carba mix +1  16. MX-04 0.6 pet Black rubber mix  17. C-009A 0.01 aq Methylisothiazolinone + Methylchloroisothiazolinone / (Cl+-Meisothiazolinone (Kathon CG 100ppm))  18. C-007B 2.0 pet Quaternium-15 (Dowicil 200) / (1-(3-Chloroallyl)-3,5,7-triaza-1-  azoniaadamantane chloride)  19. H-031B 0.5% pet Hydroperoxides of Linalool +1  20. P-006 1.0 pet p-Phenylenediamine (PPD  21. F-002B 2.0 aq Formaldehyde  22. MX-05B 1.0 pet Mercapto mix  23. B-015B 0.5 pet 2-Bromo-2-nitropropane-l,3-diol (Bronopol)  24. MX-01 1.0 pet Thiuram mix  25. D-044C 1.0 pet Diazolidinylurea (Germall II)  26. B-004 5.0 pet Benzocaine  27. T-031A 1.0 pet Tixocortol-21-pivalate  28. G-005B 2.0 pet Gold(I)sodium thiosulfate dihydrate +3  29. I-001A 2.0 pet Imidazolidinyl urea Germall 115)  30. B-033A 0.1 pet B-033A Budesonide  31. H-021B 1.0 pet Hydrocortisone-17-butyrate  32. M-003B 1.0 pet 2-Mercaptobenzothiazole (MBT)  33. B-032B 20.0 pet Bacitracin  34. MX-25 14.0 pet Fragrance Mix II  35. MX-26 1.0 pet Disperse Blue 106/124 Mix  36. L-002B 15.0 pet Lidocaine  37. P-019B 30.0 aq Propylene glycol  38. I-008C 0.2 pet Iodopropynyl butyl carbamate  39. P-026 5.0% pet Polymyxin B sulfate  40. C-018 1.0 aq Cocamidopropylbetaine  41. MX-24 1.0 pet Mixed dialkyl thiourea  42. D-053 1.0 aq  Dimethylaminopropylamine  43. H-010 2.0 pet HEMA (2-Hydroxyethyl methacrylate)  44. O-005 0.1 aq Oleamidopropyl dimethylamine  45. D-065 5.0 pet Decyl Glucoside  46. M-013 2.0 pet Methyl methacrylate  47. L-001 2.0 pet Lavender absolute/(Lavandula)  48. C-014 1.0 pet Cinnamal / (Cinnamic aldehyde)  49. T-036 100.0 Tocopherol/ (DL alpha tocopherol)  50. E-004 0.1 pet Ethyl acrylate  51. T-035B 5.0 pet Tea Tree Oil  52. C-005 0.5 aq Chlorhexidine digluconate  53. P-022 10.0 pet Propolis  54. C-010B 1.0 pet Chloroxylenol (PCMX) / (4-Chloro-3,5-xylenol (PCMX))  55. H-014C 10.0 pet Benzophenone-3 / (2-Hydroxy-4-methoxybenzophenone)  56. T-010 10.0 pet Toluenesulfonamide formaldehyde resin  57. MX-18 0.1 pet Sesquiterpene lactone mix  58. C-019 0.5 pet Cocamide ÁNGELA / (Coconut diethanolamide)  59. H-032B 0.2% pet Hydroperoxides of Limonene  60. B-027B 0.1 pet Benzalkonium chloride  61. H-023C 2.0 pet Benzophenone-4 / (2-Hydroxy-4-methoxy-benzophenone-5-sulfonic   acid)  62. S-001 5.0 pet Sodium benzoate +1  63. S-003 2.0 pet Sorbic acid  64. Y-001 2.0 pet [place] odorata oil / (Ylang-Ylang oil)  65. MX-29A 5.0 pet Compositae Mix II  66. MX-16 5.0 pet Ethyleneurea, melamine formaldehyde mix  67. S-005 20.0 pet Sorbitan sesquioleate Irr  68. D-022 1.0 pet N,N-Diphenylguanidine  69. L-003 5.0 pet Hydroxyisohexyl 3-Cyclohexene Carboxaldehyde (Lyral)  70. E-027 5.0% pet ETHYLHEXYLGLYCERIN  71. T-030 1.0 pet Triamcinolone acetonide  72. C-028 1.0 pet Clobetasol-17-propionate  73. A-029 0.1 aq Amidoamine  74. E-023 10.0 pet Ethyl cyanoacrylate   75. P-025 1.0 pet 2-Phenoxyethanol  76. D-032 1.0 pet Disperse [place] 3  77. B-005 5.0 pet Benzoic acid  78. D-006 2.0 pet BHT / (2,1-Rg-vkdt-butyl-4-cresol)  79. E-019C 10.0 pet 2-Ethylhexyl-4-methoxycinnamate (Parsol MCX)  80. B-008B 10.0 soft Benzyl alcohol  81. C-033 20.0 pet Cetearyl alcohol / (Cetyl stearyl alcohol (Nicole O))  82. C-059 2.5 pet [place]  83. B-010B 10.0 pet Benzyl  salicylate  84. D-036 1.0 pet Disperse Yellow 3  85. J-002 2.0 pet Pao absolute  86. P-036 2.0 pet Peppermint oil  87. P-039 2.0 pet Pramoxine hydrochloride  88. S-015 20.0 alc Shellac  89. L-004 3.0 pet lauryl polyglucose  90. C-008 1.0 pet p-Chloro-m-cresol / (4-Chloro-3-cresol ([facility]))  91. D-049E 0.5% Methyldibromo Glutaronitrile     92. G-003B 5% Glutaral      93. T-007 0.1% Thimerosol   94. B022 2% 2-tert-butyl-4-methoxyphenol   95. G-004 1% Glyceryl monothioglycolate  96. T-016 2% Triethanolamine  97. Mx-30 6.6% Textile dye mix   98. I-003 20% Isopropyl myristate   99. D-057 1% Desoximetasone  100. P-013 5% Polysorbate 80   101. O-004 0.1% 2-n-Octyl-4-isothiazolin-3-one    102. F003 2% Fusidic acid sodium salt  103. D-005B 2.5% Dibucaine hydrochloride   104. B-007 1% Benzoylperoxide   105. I-009 10% Isoamyl-p-methoxycinnamate   106. O-007A 5% Ethylhexyl Salicylate       #66 Not Tested  #69 Not Tested  10. B-001 25.0 pet Myroxylon pereirae resin / (Balsam [place]) +2  15. MX-06 3.0 pet Carba mix +1  19. H-031B 0.5% pet Hydroperoxides of Linalool +1  28. G-005B 2.0 pet Gold(I)sodium thiosulfate dihydrate +3  62. S-001 5.0 pet Sodium benzoate +1  67. S-005 20.0 pet Sorbitan sesquioleate Irr   [No Acute Distress] : no acute distress [Well Nourished] : well nourished [Well Developed] : well developed [EOMI] : extra ocular movement intact [Normal Sclera/Conjunctiva] : normal sclera/conjunctiva [Normal Outer Ear/Nose] : the ears and nose were normal in appearance [Normal Oropharynx] : the oropharynx was normal [No JVD] : no jugular venous distention [Clear to Auscultation] : lungs were clear to auscultation bilaterally [No Accessory Muscle Use] : no accessory muscle use [No Respiratory Distress] : no respiratory distress [No Murmurs] : no murmurs heard [Regular Rhythm] : with a regular rhythm [Normal Rate] : heart rate was normal  [Normal S1, S2] : normal S1 and S2 [No Edema] : there was no peripheral edema [Normal Bowel Sounds] : normal bowel sounds [Non Tender] : non-tender [Not Distended] : not distended [Soft] : abdomen soft [Normal Post Cervical Nodes] : no posterior cervical lymphadenopathy [Normal Anterior Cervical Nodes] : no anterior cervical lymphadenopathy [No Spinal Tenderness] : no spinal tenderness [No CVA Tenderness] : no ~M costovertebral angle tenderness [No Rash] : no rash [Kyphosis] :  kyphosis present [Cranial Nerves Intact] : cranial nerves 2-12 were intact [Oriented x3] : oriented to person, place, and time [Normal Affect] : the affect was normal [de-identified] : Frail elderly women.  Reported that she does not want to be seen go away.  I am fine.   [de-identified] : dry skin [de-identified] : Agitated refusing to be seen, Pulling sheets over her head.

## 2025-02-10 NOTE — ED ADULT NURSE NOTE - NSIMPLEMENTINTERV_GEN_ALL_ED
done Implemented All Fall with Harm Risk Interventions:  [place] to call system. Call bell, personal items and telephone within reach. Instruct patient to call for assistance. Room bathroom lighting operational. Non-slip footwear when patient is off stretcher. Physically safe environment: no spills, clutter or unnecessary equipment. Stretcher in lowest position, wheels locked, appropriate side rails in place. Provide visual cue, wrist band, yellow gown, etc. Monitor gait and stability. Monitor for mental status changes and reorient to person, place, and time. Review medications for side effects contributing to fall risk. Reinforce activity limits and safety measures with patient and family. Provide visual clues: red socks.